# Patient Record
(demographics unavailable — no encounter records)

---

## 2024-10-21 NOTE — PLAN
[FreeTextEntry1] : #HCM -appreciated recent labs (had on phone) with GYN, CBC, A1c (5.4), TSH, Vitamin D wnl, will f/u CMP (hold off on lipids during pregnancy)  -vaccinations:  COVID: received primary series  Influenza: declined   TDAP: up to date - and will do during pregnancy with GYN  -depression screen negative -follows w/ GYN up to date w/ pap smear -follow-up yearly and PRN (will be seeing Endo for hypothyroidism during pregnancy)

## 2024-10-21 NOTE — HEALTH RISK ASSESSMENT
[Never] : Never [With Significant Other] : lives with significant other [Employed] : employed [] :  [No] : In the past 12 months have you used drugs other than those required for medical reasons? 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

## 2024-10-21 NOTE — HISTORY OF PRESENT ILLNESS
[FreeTextEntry1] : CPE  [de-identified] : 32 yo female PMH hypothyroidism, idiopathic urticaria, migraines, Vitamin D def presenting today for CPE.  She is currently 4 months pregnant, following with [facility], OB is (Dr. Peraza) also follows with [facility] - high risk pregnancy.   She has established care with endo, now takes two of her levothyroxine on two days of the week in addition to her daily dosage.   She off the sertraline, feels well.   She is having a SIBO test with GI to see if related to her history of chronic hives.

## 2024-10-21 NOTE — ASSESSMENT
[FreeTextEntry1] : 32 yo female PMH hypothyroidism, idiopathic urticaria, migraines, Vitamin D def presenting today for CPE.

## 2024-10-21 NOTE — HEALTH RISK ASSESSMENT
[Never] : Never [With Significant Other] : lives with significant other [Employed] : employed [] :  [No] : In the past 12 months have you used drugs other than those required for medical reasons? 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

## 2024-10-21 NOTE — HISTORY OF PRESENT ILLNESS
[FreeTextEntry1] : CPE  [de-identified] : 30 yo female PMH hypothyroidism, idiopathic urticaria, migraines, Vitamin D def presenting today for CPE.  She is currently 4 months pregnant, following with [facility], OB is (Dr. Peraza) also follows with [facility] - high risk pregnancy.   She has established care with endo, now takes two of her levothyroxine on two days of the week in addition to her daily dosage.   She off the sertraline, feels well.   She is having a SIBO test with GI to see if related to her history of chronic hives.

## 2025-05-14 NOTE — PLAN
[FreeTextEntry1] :     #Hypothyroidism -currently following w/ endocrinologist  -f/u TSH, T3, T4  -was on increased dosage during pregnancy, now back to 75 mcg QD of levothyroxine   #Anixety  -doing well off of sertraline 25 mg QD -Xanax PRN for anxiety, will let me know if she needs a refill   #Weight management   -Discussed Mediterranean diet, meals w/ lean proteins/healthy fats/fiber, low carbs, whole grains instead of refined carbs, no processed foods -she continue to walk for exercise  - referral for weight loss center through [facility]   #Wrist pain -f/u with ortho hand

## 2025-05-14 NOTE — HISTORY OF PRESENT ILLNESS
[FreeTextEntry1] : follow-up   [de-identified] : 30 yo female PMH hypothyroidism, idiopathic urticaria, migraines, Vitamin D def presenting today for follow-up.   She had a baby boy on April 11th, is back to her pre-pregnancy weight.  She had gained weight prior to being pregnant since 2018 - in the interim was diagnosed with hypothyroidism and has been on levothyroxine with TSH well controlled. Her goal would be to lose about 20-25 lbs - a weight similar to where she was prior to 2018.    She also notes wrist pain, finding on L wrist - had seen ortho in the past.    She is doing well mentally off sertraline - does not wish to be back on medication at this time.

## 2025-05-14 NOTE — PLAN
[FreeTextEntry1] :     #Hypothyroidism -currently following w/ endocrinologist  -f/u TSH, T3, T4  -was on increased dosage during pregnancy, now back to 75 mcg QD of levothyroxine   #Anixety  -doing well off of sertraline 25 mg QD -Xanax PRN for anxiety, will let me know if she needs a refill   #Weight management   -Discussed Mediterranean diet, meals w/ lean proteins/healthy fats/fiber, low carbs, whole grains instead of refined carbs, no processed foods -she continue to walk for exercise  - referral for weight loss center through [facility]   #Wrist pain -f/u with ortho hand

## 2025-05-14 NOTE — ASSESSMENT
[FreeTextEntry1] : 32 yo female PMH hypothyroidism, idiopathic urticaria, migraines, Vitamin D def presenting today for follow-up.

## 2025-05-14 NOTE — HISTORY OF PRESENT ILLNESS
[FreeTextEntry1] : follow-up   [de-identified] : 32 yo female PMH hypothyroidism, idiopathic urticaria, migraines, Vitamin D def presenting today for follow-up.   She had a baby boy on April 11th, is back to her pre-pregnancy weight.  She had gained weight prior to being pregnant since 2018 - in the interim was diagnosed with hypothyroidism and has been on levothyroxine with TSH well controlled. Her goal would be to lose about 20-25 lbs - a weight similar to where she was prior to 2018.    She also notes wrist pain, finding on L wrist - had seen ortho in the past.    She is doing well mentally off sertraline - does not wish to be back on medication at this time.

## 2025-05-14 NOTE — ASSESSMENT
[FreeTextEntry1] : 30 yo female PMH hypothyroidism, idiopathic urticaria, migraines, Vitamin D def presenting today for follow-up.

## 2025-05-20 NOTE — ADDENDUM
[FreeTextEntry1] : I, Verena Snider wrote this note acting as a scribe for Dr. Dat Jeronimo on 05/20/2025.   All medical record entries made by the Scribe were at my, Dr. Dat Jeronimo MD., direction and personally dictated by me on 05/20/2025. I have personally reviewed the chart and agree that the record accurately reflects my personal performance of the history, physical exam, assessment and plan.

## 2025-05-20 NOTE — PHYSICAL EXAM
[de-identified] : Patient is WDWN, alert, and in no acute distress. Breathing is unlabored. She is grossly oriented to person, place, and time.  Right Wrist -  Inspection/Palpation: Tenderness over 2cm dorsal soft tissue mass which is most prominent with wrist flexion. Mild edema. No ecchymosis. No deformities. No discoloration.  Stability: No joint instability on provocative testing. Range of Motion: Full Strength: Normal Sensation is intact.   Left Wrist -  Inspection/Palpation: Tenderness over dorsal soft tissue mass. Mild edema. No ecchymosis. No deformities. No discoloration.  Stability: No joint instability on provocative testing. Range of Motion: Strength:  Sensation is intact.   Right Wrist:  No tenderness, edema, or deformities. No thenar atrophy. Full ROM with decreased sensation along median nerve distribution.  Tests/Signs: Tinel's sign is negative over carpal tunnel, Phalen's test is negative.   Left Wrist:  No tenderness, edema, or deformities. No thenar atrophy. Full ROM with decreased sensation along median nerve distribution.  Tests/Signs: Tinel's sign is negative over carpal tunnel, Phalen's test is negative.   [de-identified] : AP, lateral and oblique views of the right and left were obtained today and revealed no abnormalities. No acute fracture. No dislocation. Cartilage spaces are maintained.

## 2025-05-20 NOTE — DISCUSSION/SUMMARY
[FreeTextEntry1] : The underlying pathophysiology was reviewed with the patient. XR films were reviewed with the patient. Discussed at length the nature of the patients condition. The b/l wrist symptoms are secondary to ganglion cysts and mild carpal tunnel syndrome.   At this time, treatment options were discussed including; observation, aspiration, and surgical excision.   The patient wishes to proceed with excision of right dorsal wrist mass and possibly right carpal tunnel release at this time. The risks and benefits were reviewed with the patient.  All of his questions were answered. She will meet with our surgical scheduler.   She will consider carpal tunnel syndrome surgery since she has had numbness/tingling symptoms for years.   All questions answered, understanding verbalized. Patient in agreement with plan of care. The patient can follow-up post-operatively. If the patient begins to experience any changes or severe exacerbation of her symptoms, she should reach out to me as soon as possible.

## 2025-05-20 NOTE — HISTORY OF PRESENT ILLNESS
[FreeTextEntry1] : Pt is a 30 y/o female c/o bilateral wrist pain R>L for years.  She has cysts in both wrists.  She has been treated with cortisone injections in the wrist.  She has never had the cyst aspirated.  She had ultrasound that showed a cyst in the left wrist.  It did not show a cyst in the right wrist at that time. She has pain with extension of the wrist.  It is tender to touch.  It does not fluctuate in size. She also reports intermittent numbness/tinging for years. She recently had a baby as well.

## 2025-06-10 NOTE — ADDENDUM
[FreeTextEntry1] : I, Verena Snider wrote this note acting as a scribe for Dr. Dat Jeronimo on 06/10/2025.   All medical record entries made by the Scribe were at my, Dr. Dat Jeronimo MD., direction and personally dictated by me on 06/10/2025. I have personally reviewed the chart and agree that the record accurately reflects my personal performance of the history, physical exam, assessment and plan.

## 2025-06-10 NOTE — DISCUSSION/SUMMARY
[FreeTextEntry1] : The underlying pathophysiology was reviewed with the patient. XR films were reviewed with the patient. Discussed at length the nature of the patients condition. The right hand symptoms are secondary to Right carpal tunnel syndrome and Right dorsal wrist mass. She is s/p Right carpal tunnel release and Excision of right dorsal wrist mass.   I reassured the patient that her residual symptoms are within the nature of her procedure.  The sutures were removed in office today 06/10/2025. The patient was instructed on local wound care and to keep the incision site dry for 14 days from the date of surgery. They may then begin to massage the scar with vitamin E oil once benzoin and steri strips fall off. An ace bandage was also applied   Gentle range of motion, stretching, and strengthening exercises were encouraged. Patient should actively work on gradually increasing use of the hand, as tolerated.   All questions answered, understanding verbalized. Patient in agreement with plan of care. Patient is advised to follow-up in 6 weeks or as needed.  If the patient begins to experience any changes or severe exacerbation of her symptoms, she should reach out to me as soon as possible.

## 2025-06-10 NOTE — HISTORY OF PRESENT ILLNESS
[FreeTextEntry1] : The patient is a 32 year female who returns for the 1st postoperative visit after undergoing Right carpal tunnel release and Excision of right dorsal wrist mass at [facility]. The surgery was on 05/30/2025. The patient is recovering at home. She reports mild postoperative pain.

## 2025-06-10 NOTE — PHYSICAL EXAM
[de-identified] : No new imaging taken today. [de-identified] : Patient is WDWN, alert, and in no acute distress. Breathing is unlabored. She is grossly oriented to person, place, and time.  Right hand: Incision is healing well. There are no signs of infection. Sutures were removed from palm wound.  Dorsal wound with steri strips FROM Normal sensation

## 2025-06-10 NOTE — REVIEW OF SYSTEMS
[Joint Pain] : joint pain [Negative] : Allergic/Immunologic As certified below, I, or a nurse practitioner or physician assistant working with me, had a face-to-face encounter that meets the physician face-to-face encounter requirements.